# Patient Record
Sex: MALE | Race: WHITE | Employment: FULL TIME | URBAN - METROPOLITAN AREA
[De-identification: names, ages, dates, MRNs, and addresses within clinical notes are randomized per-mention and may not be internally consistent; named-entity substitution may affect disease eponyms.]

---

## 2022-02-18 ENCOUNTER — OFFICE VISIT (OUTPATIENT)
Dept: PRIMARY CARE CLINIC | Age: 27
End: 2022-02-18
Payer: COMMERCIAL

## 2022-02-18 VITALS
DIASTOLIC BLOOD PRESSURE: 72 MMHG | HEIGHT: 71 IN | WEIGHT: 178.6 LBS | SYSTOLIC BLOOD PRESSURE: 122 MMHG | TEMPERATURE: 97.6 F | OXYGEN SATURATION: 98 % | HEART RATE: 85 BPM | BODY MASS INDEX: 25 KG/M2

## 2022-02-18 DIAGNOSIS — R01.1 HEART MURMUR: ICD-10-CM

## 2022-02-18 DIAGNOSIS — J20.8 VIRAL BRONCHITIS: Primary | ICD-10-CM

## 2022-02-18 PROCEDURE — 99203 OFFICE O/P NEW LOW 30 MIN: CPT | Performed by: FAMILY MEDICINE

## 2022-02-18 SDOH — ECONOMIC STABILITY: FOOD INSECURITY: WITHIN THE PAST 12 MONTHS, THE FOOD YOU BOUGHT JUST DIDN'T LAST AND YOU DIDN'T HAVE MONEY TO GET MORE.: NEVER TRUE

## 2022-02-18 SDOH — ECONOMIC STABILITY: FOOD INSECURITY: WITHIN THE PAST 12 MONTHS, YOU WORRIED THAT YOUR FOOD WOULD RUN OUT BEFORE YOU GOT MONEY TO BUY MORE.: NEVER TRUE

## 2022-02-18 ASSESSMENT — ENCOUNTER SYMPTOMS
SINUS PAIN: 0
SINUS PRESSURE: 0
SORE THROAT: 0
CHEST TIGHTNESS: 0
COUGH: 1
CONSTIPATION: 0
WHEEZING: 0
SHORTNESS OF BREATH: 0
DIARRHEA: 0
ABDOMINAL PAIN: 0
RHINORRHEA: 0

## 2022-02-18 ASSESSMENT — SOCIAL DETERMINANTS OF HEALTH (SDOH): HOW HARD IS IT FOR YOU TO PAY FOR THE VERY BASICS LIKE FOOD, HOUSING, MEDICAL CARE, AND HEATING?: NOT HARD AT ALL
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
## 2022-04-01 DIAGNOSIS — R20.0 ARM NUMBNESS: Primary | ICD-10-CM

## 2022-09-30 ENCOUNTER — HOSPITAL ENCOUNTER (OUTPATIENT)
Age: 27
Discharge: HOME | End: 2022-09-30
Payer: COMMERCIAL

## 2022-09-30 ENCOUNTER — HOSPITAL ENCOUNTER (OUTPATIENT)
Dept: GENERAL RADIOLOGY | Age: 27
Discharge: HOME | End: 2022-09-30
Payer: COMMERCIAL

## 2022-09-30 DIAGNOSIS — M25.511 ACUTE PAIN OF RIGHT SHOULDER: ICD-10-CM

## 2022-09-30 DIAGNOSIS — S47.1XXA: ICD-10-CM

## 2022-09-30 PROCEDURE — 73000 X-RAY EXAM OF COLLAR BONE: CPT

## 2022-09-30 PROCEDURE — 73030 X-RAY EXAM OF SHOULDER: CPT

## 2022-09-30 PROCEDURE — 73010 X-RAY EXAM OF SHOULDER BLADE: CPT

## 2022-11-15 ENCOUNTER — HOSPITAL ENCOUNTER (OUTPATIENT)
Dept: CARDIOLOGY | Age: 27
Discharge: HOME | End: 2022-11-15
Payer: COMMERCIAL

## 2022-11-15 DIAGNOSIS — R01.1 HEART MURMUR: ICD-10-CM

## 2022-11-15 LAB
LV EF: 55 %
LVEF MODALITY: NORMAL

## 2022-11-15 PROCEDURE — 93306 TTE W/DOPPLER COMPLETE: CPT

## 2024-01-22 SDOH — HEALTH STABILITY: PHYSICAL HEALTH: ON AVERAGE, HOW MANY MINUTES DO YOU ENGAGE IN EXERCISE AT THIS LEVEL?: 60 MIN

## 2024-01-22 SDOH — HEALTH STABILITY: PHYSICAL HEALTH: ON AVERAGE, HOW MANY DAYS PER WEEK DO YOU ENGAGE IN MODERATE TO STRENUOUS EXERCISE (LIKE A BRISK WALK)?: 4 DAYS

## 2024-01-25 ENCOUNTER — OFFICE VISIT (OUTPATIENT)
Dept: FAMILY MEDICINE CLINIC | Age: 29
End: 2024-01-25
Payer: COMMERCIAL

## 2024-01-25 VITALS
OXYGEN SATURATION: 99 % | DIASTOLIC BLOOD PRESSURE: 68 MMHG | HEIGHT: 71 IN | SYSTOLIC BLOOD PRESSURE: 120 MMHG | BODY MASS INDEX: 25.45 KG/M2 | WEIGHT: 181.8 LBS | HEART RATE: 69 BPM | TEMPERATURE: 98.1 F

## 2024-01-25 DIAGNOSIS — Z76.89 ENCOUNTER TO ESTABLISH CARE: Primary | ICD-10-CM

## 2024-01-25 DIAGNOSIS — R00.2 PALPITATIONS: ICD-10-CM

## 2024-01-25 DIAGNOSIS — Z87.898 HISTORY OF SEIZURES: ICD-10-CM

## 2024-01-25 PROBLEM — G54.0 TOS (THORACIC OUTLET SYNDROME): Status: ACTIVE | Noted: 2022-05-11

## 2024-01-25 LAB
ALBUMIN SERPL-MCNC: 4.7 G/DL (ref 3.4–5)
ALBUMIN/GLOB SERPL: 1.7 {RATIO} (ref 1.1–2.2)
ALP SERPL-CCNC: 103 U/L (ref 40–129)
ALT SERPL-CCNC: 18 U/L (ref 10–40)
ANION GAP SERPL CALCULATED.3IONS-SCNC: 8 MMOL/L (ref 3–16)
AST SERPL-CCNC: 18 U/L (ref 15–37)
BILIRUB SERPL-MCNC: 0.4 MG/DL (ref 0–1)
BUN SERPL-MCNC: 22 MG/DL (ref 7–20)
CALCIUM SERPL-MCNC: 9.2 MG/DL (ref 8.3–10.6)
CHLORIDE SERPL-SCNC: 105 MMOL/L (ref 99–110)
CO2 SERPL-SCNC: 28 MMOL/L (ref 21–32)
CREAT SERPL-MCNC: 0.9 MG/DL (ref 0.9–1.3)
DEPRECATED RDW RBC AUTO: 13.4 % (ref 12.4–15.4)
GFR SERPLBLD CREATININE-BSD FMLA CKD-EPI: >60 ML/MIN/{1.73_M2}
GLUCOSE SERPL-MCNC: 95 MG/DL (ref 70–99)
HCT VFR BLD AUTO: 43.4 % (ref 40.5–52.5)
HGB BLD-MCNC: 14.9 G/DL (ref 13.5–17.5)
MCH RBC QN AUTO: 29.8 PG (ref 26–34)
MCHC RBC AUTO-ENTMCNC: 34.3 G/DL (ref 31–36)
MCV RBC AUTO: 87.1 FL (ref 80–100)
PLATELET # BLD AUTO: 223 K/UL (ref 135–450)
PMV BLD AUTO: 7.5 FL (ref 5–10.5)
POTASSIUM SERPL-SCNC: 4.3 MMOL/L (ref 3.5–5.1)
PROT SERPL-MCNC: 7.4 G/DL (ref 6.4–8.2)
RBC # BLD AUTO: 4.99 M/UL (ref 4.2–5.9)
SODIUM SERPL-SCNC: 141 MMOL/L (ref 136–145)
TSH SERPL DL<=0.005 MIU/L-ACNC: 1.96 UIU/ML (ref 0.27–4.2)
WBC # BLD AUTO: 4.5 K/UL (ref 4–11)

## 2024-01-25 PROCEDURE — 99204 OFFICE O/P NEW MOD 45 MIN: CPT | Performed by: NURSE PRACTITIONER

## 2024-01-25 SDOH — ECONOMIC STABILITY: FOOD INSECURITY: WITHIN THE PAST 12 MONTHS, THE FOOD YOU BOUGHT JUST DIDN'T LAST AND YOU DIDN'T HAVE MONEY TO GET MORE.: NEVER TRUE

## 2024-01-25 SDOH — ECONOMIC STABILITY: FOOD INSECURITY: WITHIN THE PAST 12 MONTHS, YOU WORRIED THAT YOUR FOOD WOULD RUN OUT BEFORE YOU GOT MONEY TO BUY MORE.: NEVER TRUE

## 2024-01-25 SDOH — ECONOMIC STABILITY: HOUSING INSECURITY
IN THE LAST 12 MONTHS, WAS THERE A TIME WHEN YOU DID NOT HAVE A STEADY PLACE TO SLEEP OR SLEPT IN A SHELTER (INCLUDING NOW)?: NO

## 2024-01-25 SDOH — ECONOMIC STABILITY: INCOME INSECURITY: HOW HARD IS IT FOR YOU TO PAY FOR THE VERY BASICS LIKE FOOD, HOUSING, MEDICAL CARE, AND HEATING?: NOT HARD AT ALL

## 2024-01-25 ASSESSMENT — ENCOUNTER SYMPTOMS
ROS SKIN COMMENTS: MOLES
BACK PAIN: 1
GASTROINTESTINAL NEGATIVE: 1
COUGH: 0
SHORTNESS OF BREATH: 0

## 2024-01-25 ASSESSMENT — PATIENT HEALTH QUESTIONNAIRE - PHQ9
SUM OF ALL RESPONSES TO PHQ9 QUESTIONS 1 & 2: 0
SUM OF ALL RESPONSES TO PHQ QUESTIONS 1-9: 0
1. LITTLE INTEREST OR PLEASURE IN DOING THINGS: 0
2. FEELING DOWN, DEPRESSED OR HOPELESS: 0
SUM OF ALL RESPONSES TO PHQ QUESTIONS 1-9: 0

## 2024-01-25 NOTE — PROGRESS NOTES
Maikel Wall (:  1995) is a 28 y.o. male,New patient, here for evaluation of the following chief complaint(s):  New Patient (New patient to get established. Pt has epilepsy as a child and took medicine. Spouse states she sees him flutter his eyes and it was a symptom as a kid. Is fasting. ) and Palpitations (If patient has too much alcohol or high calorie intake then he will get heart palpitations. It is off and on. )         ASSESSMENT/PLAN:  1. Encounter to establish care  Reviewed PMH, medications and need for labs.     2. Palpitations  New. Most likely related to dehydration and alcohol use. Discussed red flag s/sx. Follow pt education included in AVS.   - CBC; Future  - Comprehensive Metabolic Panel; Future  - TSH; Future    3. History of seizures  Stable. Recommend neuro eval and follow up. Encouraged pt to call today.   - External Referral To Neurology  - Dani JOSHI Stephanie A, MD, Neurology, [facility]       Return for yearly physical.         Subjective   SUBJECTIVE/OBJECTIVE:  HPI  Chief Complaint   Patient presents with    New Patient     New patient to get established. Pt has epilepsy as a child and took medicine. Spouse states she sees him flutter his eyes and it was a symptom as a kid. Is fasting.     Palpitations     If patient has too much alcohol or high calorie intake then he will get heart palpitations. It is off and on.    Pt does not notice the blinking episodes, states he only notices bc his wife tells him. Symptoms are more in the afternoon, that is when he is around his wife the most. States he is able stay focused, does not feel like he is not remembering or having loc. States he had absence seizures in the past. Pt was treated in [place] for seizures, has not had medications in 15+yrs.     Palpitations-over the last week, had one episode of dizziness at work, not sure if it was related to the palpitations. Social drinking, 1-3 drinks a week. If drinks one day avoids for the

## 2024-05-13 ENCOUNTER — TELEPHONE (OUTPATIENT)
Age: 29
End: 2024-05-13

## 2024-05-13 ENCOUNTER — OFFICE VISIT (OUTPATIENT)
Dept: NEUROLOGY | Age: 29
End: 2024-05-13
Payer: COMMERCIAL

## 2024-05-13 VITALS
BODY MASS INDEX: 25.9 KG/M2 | HEART RATE: 66 BPM | SYSTOLIC BLOOD PRESSURE: 122 MMHG | WEIGHT: 185 LBS | HEIGHT: 71 IN | OXYGEN SATURATION: 99 % | DIASTOLIC BLOOD PRESSURE: 64 MMHG

## 2024-05-13 DIAGNOSIS — R56.9 SEIZURE (HCC): Primary | ICD-10-CM

## 2024-05-13 PROCEDURE — 99204 OFFICE O/P NEW MOD 45 MIN: CPT | Performed by: PSYCHIATRY & NEUROLOGY

## 2024-05-13 NOTE — PROGRESS NOTES
Neurology outpatient new visit    Patient name: Maikel Wall      Chief Complaint:  Absence seizure.    History of present illness:  This is a 28 years old right-handed male.  The patient is here for evaluation of absence seizure.  The patient was diagnosed with absence seizure since childhood.  But the patient never been on antiseizure medication.  The patient also reports intermittent muscle twitching during the day.  The patient denies history of grand mal seizure.  Per the patient, he is not aware when he has the seizure.  The patient was noticed by his wife multiple times with eyelid twitching.  The patient had EEG done in the past.  But he cannot remember the results.    Past medical history:    Past Medical History:   Diagnosis Date    ADHD (attention deficit hyperactivity disorder) 1996    Seizures (HCC) 1996    Early as i know.       Past surgical history:    Past Surgical History:   Procedure Laterality Date    COSMETIC SURGERY  06/06/2020    Had a mole removed        Medication:    No current outpatient medications on file.     No current facility-administered medications for this visit.       Allergies:    Allergies as of 05/13/2024 - Fully Reviewed 05/13/2024   Allergen Reaction Noted    Augmentin [amoxicillin-pot clavulanate]  02/18/2022        Social history:     reports that he has never smoked. He has never used smokeless tobacco. He reports current alcohol use of about 1.0 - 2.0 standard drink of alcohol per week. He reports that he does not use drugs.     Family history:    Family History   Problem Relation Age of Onset    Diabetes Maternal Grandmother         Review of system:  No chest pain, shortness of breath, palpitation, cough, fever, abdominal pain, vomiting, diarrhea, dysuria, vertigo, joint pain, change in speech/vision or new onset of weakness/numbness. Remaining as per HPI.      /64 (Site: Left Upper Arm)   Pulse 66   Ht 1.803 m (5' 11\")   Wt 83.9 kg (185 lb)

## 2024-05-13 NOTE — TELEPHONE ENCOUNTER
Pt had NP consult with Dr. Armas today & he ordered EEG which is scheduled 5/24/24 @ [facility].  Per Dr. Armas - if results are normal, he does not need f/u with us.

## 2024-05-13 NOTE — PATIENT INSTRUCTIONS
YOU MUST CONFIRM YOUR APPOINTMENT 1 DAY PRIOR OR IT WILL BE CANCELLED!!   Our office will call you 3 times the day prior to your appointment in an attempt to confirm.  Please return our call ASAP or confirm your appt through [facility] no later than 3 pm the day before your appointment.  If we do not hear back from you by 3 pm to confirm, your appointment will be cancelled & someone will be added into that slot from our wait list.       EEG PREP:  1. Patient must NOT have more than 5 hours of sleep prior to the EEG  2. Patient should have CLEAN hair, no hairspray, gel, cream rinse, hair pins, or chemical treatments within 48 hours prior to EEG  3. NO caffeine, pain medications or sedatives on the day of the test   4. Arrive 30 minutes prior to appointment time (check in at Registration Desk on 1st floor of hospital main entrance - by the [employer])  5. Procedure will last 60-90 minutes.

## 2024-05-24 ENCOUNTER — HOSPITAL ENCOUNTER (OUTPATIENT)
Dept: NEUROLOGY | Age: 29
Discharge: HOME | End: 2024-05-24
Payer: COMMERCIAL

## 2024-05-24 DIAGNOSIS — R56.9 SEIZURE (HCC): ICD-10-CM

## 2024-05-24 PROCEDURE — 95813 EEG EXTND MNTR 61-119 MIN: CPT

## 2024-05-28 NOTE — TELEPHONE ENCOUNTER
Per Dr. Armas - pt needs f/u appt to discuss results of EEG which were abnormal.  Office to watch for cancellations.

## 2024-05-29 NOTE — TELEPHONE ENCOUNTER
Spoke with pt - he prefers [facility] but there's no appointments open at this time in [facility] for the month of June so I scheduled him for [facility] on 6/6 @ 1:50 to discuss results.

## 2024-06-06 ENCOUNTER — TELEMEDICINE (OUTPATIENT)
Age: 29
End: 2024-06-06
Payer: COMMERCIAL

## 2024-06-06 DIAGNOSIS — R56.9 SEIZURE (HCC): Primary | ICD-10-CM

## 2024-06-06 PROCEDURE — 99214 OFFICE O/P EST MOD 30 MIN: CPT | Performed by: PSYCHIATRY & NEUROLOGY

## 2024-06-06 RX ORDER — LEVETIRACETAM 500 MG/1
500 TABLET ORAL 2 TIMES DAILY
Qty: 60 TABLET | Refills: 3 | Status: SHIPPED | OUTPATIENT
Start: 2024-06-06

## 2024-06-06 NOTE — PROGRESS NOTES
Neurology outpatient F/U visit    Patient name: Maikel Wall      Chief Complaint:  Absence seizure.    History of present illness:  This is a 28 years old right-handed male.  The patient is here for evaluation of absence seizure.  The patient was diagnosed with absence seizure since childhood.  But the patient never been on antiseizure medication.  The patient also reports intermittent muscle twitching during the day.  The patient denies history of grand mal seizure.  Per the patient, he is not aware when he has the seizure.  The patient was noticed by his wife multiple times with eyelid twitching.  The patient had EEG done in the past.  But he cannot remember the results.    Interval History:  06/06/24: The patient is here for follow-up after the EEG.  Unfortunately, the EEG showed generalized atypical spike and wave.  The patient has not had any further seizures so far.    Past medical history:    Past Medical History:   Diagnosis Date    ADHD (attention deficit hyperactivity disorder) 1996    Seizures (HCC) 1996    Early as i know.       Past surgical history:    Past Surgical History:   Procedure Laterality Date    COSMETIC SURGERY  06/06/2020    Had a mole removed        Medication:    Current Outpatient Medications   Medication Sig Dispense Refill    levETIRAcetam (KEPPRA) 500 MG tablet Take 1 tablet by mouth 2 times daily 60 tablet 3     No current facility-administered medications for this visit.       Allergies:    Allergies as of 06/06/2024 - Fully Reviewed 06/06/2024   Allergen Reaction Noted    Augmentin [amoxicillin-pot clavulanate]  02/18/2022        Social history:     reports that he has never smoked. He has never used smokeless tobacco. He reports current alcohol use of about 1.0 - 2.0 standard drink of alcohol per week. He reports that he does not use drugs.     Family history:    Family History   Problem Relation Age of Onset    Diabetes Maternal Grandmother         Review of

## 2024-08-12 RX ORDER — LEVETIRACETAM 500 MG/1
500 TABLET ORAL 2 TIMES DAILY
Qty: 180 TABLET | Refills: 0 | Status: SHIPPED | OUTPATIENT
Start: 2024-08-12

## 2024-09-10 ENCOUNTER — TELEPHONE (OUTPATIENT)
Age: 29
End: 2024-09-10

## 2024-09-25 ENCOUNTER — HOSPITAL ENCOUNTER (OUTPATIENT)
Dept: NEUROLOGY | Age: 29
Discharge: HOME | End: 2024-09-25
Payer: COMMERCIAL

## 2024-09-25 PROCEDURE — 95813 EEG EXTND MNTR 61-119 MIN: CPT | Performed by: PSYCHIATRY & NEUROLOGY

## 2024-09-25 PROCEDURE — 95813 EEG EXTND MNTR 61-119 MIN: CPT

## 2024-09-30 ENCOUNTER — OFFICE VISIT (OUTPATIENT)
Dept: NEUROLOGY | Age: 29
End: 2024-09-30
Payer: COMMERCIAL

## 2024-09-30 VITALS
WEIGHT: 194 LBS | HEART RATE: 74 BPM | DIASTOLIC BLOOD PRESSURE: 74 MMHG | OXYGEN SATURATION: 98 % | SYSTOLIC BLOOD PRESSURE: 122 MMHG | BODY MASS INDEX: 27.16 KG/M2 | HEIGHT: 71 IN

## 2024-09-30 DIAGNOSIS — G40.309 NONINTRACTABLE GENERALIZED IDIOPATHIC EPILEPSY WITHOUT STATUS EPILEPTICUS (HCC): Primary | ICD-10-CM

## 2024-09-30 DIAGNOSIS — T88.7XXA SIDE EFFECT OF MEDICATION: ICD-10-CM

## 2024-09-30 PROCEDURE — 99214 OFFICE O/P EST MOD 30 MIN: CPT | Performed by: PSYCHIATRY & NEUROLOGY

## 2024-09-30 RX ORDER — LEVETIRACETAM 750 MG/1
750 TABLET ORAL 2 TIMES DAILY
Qty: 180 TABLET | Refills: 1 | Status: SHIPPED | OUTPATIENT
Start: 2024-09-30

## 2024-09-30 NOTE — PATIENT INSTRUCTIONS
YOU MUST CONFIRM YOUR APPOINTMENT 1 DAY PRIOR OR IT WILL BE CANCELLED!!   Our office will call you 3 times the day prior to your appointment in an attempt to confirm.  Please return our call ASAP or confirm your appt through [employer] no later than 3 pm the day before your appointment.  If we do not hear back from you by 3 pm to confirm, your appointment will be cancelled & someone will be added into that slot from our wait list.

## 2025-04-15 ENCOUNTER — HOSPITAL ENCOUNTER (OUTPATIENT)
Dept: NEUROLOGY | Age: 30
Discharge: HOME | End: 2025-04-15
Payer: COMMERCIAL

## 2025-04-15 DIAGNOSIS — T88.7XXA SIDE EFFECT OF MEDICATION: ICD-10-CM

## 2025-04-15 DIAGNOSIS — G40.309 NONINTRACTABLE GENERALIZED IDIOPATHIC EPILEPSY WITHOUT STATUS EPILEPTICUS (HCC): ICD-10-CM

## 2025-04-15 PROCEDURE — 95812 EEG 41-60 MINUTES: CPT | Performed by: PSYCHIATRY & NEUROLOGY

## 2025-04-15 PROCEDURE — 95813 EEG EXTND MNTR 61-119 MIN: CPT

## 2025-04-15 NOTE — PROCEDURES
Electroencephalogram report          Patient: Maikel Wall    MR Number: [id number]  YOB: 1995  Date of Visit: 4/15/2025    Clinical History:  The patient is a 29 y.o. years old male with history of generalized epilepsy, medications reviewed.      Method:  This is one hour digitalized EEG recording. The EEG was performed using the international 10/20 of electrode placements using both referential and bipolar montages. The patient was awake, and drowsy during recording.  Photic stimulation and hyperventilation were performed. Medications reviewed.     Findings:  The background of the EEG showed normal alpha posterior background of --- HZ and amplitude of 20-40 UV. This background was symmetric, waxing and waning, and reactive with eye opening and closure. As the patient became drowsy generalized diffuse slowing was seen through recording at 6-7 HZ.  This generalized slowing was symmetric, non rhythmical, and continuous.  Infrequent 2-3 HZ generalized spike and wave discharges were seen few times throughout the recording, occurring in isolation lasting from 1 to 2 seconds..   Photic stimulation produced normal posterior driving at higher frequency and hyperventilation did not activate the EEG.    Impression:  This one hour  EEG is abnormal.  There is generalized epileptiform discharges is consistent with idiopathic generalized epilepsy.  No evidence of focal and lateralized abnormalities.     Hemanth Mejia MD      Board certified in clinical neurophysiology  [facility]  [address] [address]  Phone: [phone]

## 2025-05-06 RX ORDER — LEVETIRACETAM 750 MG/1
750 TABLET ORAL 2 TIMES DAILY
Qty: 180 TABLET | Refills: 0 | Status: SHIPPED | OUTPATIENT
Start: 2025-05-06

## 2025-05-15 ENCOUNTER — OFFICE VISIT (OUTPATIENT)
Dept: NEUROLOGY | Age: 30
End: 2025-05-15
Payer: COMMERCIAL

## 2025-05-15 VITALS
DIASTOLIC BLOOD PRESSURE: 62 MMHG | SYSTOLIC BLOOD PRESSURE: 120 MMHG | BODY MASS INDEX: 26.9 KG/M2 | WEIGHT: 192.1 LBS | OXYGEN SATURATION: 98 % | HEART RATE: 70 BPM | HEIGHT: 71 IN

## 2025-05-15 DIAGNOSIS — G40.A09 CHILDHOOD ABSENCE EPILEPSY (HCC): Primary | ICD-10-CM

## 2025-05-15 PROBLEM — G40.309 NONINTRACTABLE GENERALIZED IDIOPATHIC EPILEPSY WITHOUT STATUS EPILEPTICUS (HCC): Status: ACTIVE | Noted: 2025-05-15

## 2025-05-15 PROCEDURE — 99213 OFFICE O/P EST LOW 20 MIN: CPT | Performed by: STUDENT IN AN ORGANIZED HEALTH CARE EDUCATION/TRAINING PROGRAM

## 2025-05-15 PROCEDURE — G8427 DOCREV CUR MEDS BY ELIG CLIN: HCPCS | Performed by: STUDENT IN AN ORGANIZED HEALTH CARE EDUCATION/TRAINING PROGRAM

## 2025-05-15 PROCEDURE — 1036F TOBACCO NON-USER: CPT | Performed by: STUDENT IN AN ORGANIZED HEALTH CARE EDUCATION/TRAINING PROGRAM

## 2025-05-15 PROCEDURE — G8419 CALC BMI OUT NRM PARAM NOF/U: HCPCS | Performed by: STUDENT IN AN ORGANIZED HEALTH CARE EDUCATION/TRAINING PROGRAM

## 2025-05-15 RX ORDER — LEVETIRACETAM 1000 MG/1
1000 TABLET ORAL 2 TIMES DAILY
Qty: 60 TABLET | Refills: 11 | Status: SHIPPED | OUTPATIENT
Start: 2025-05-15 | End: 2026-05-10

## 2025-05-15 NOTE — PROGRESS NOTES
History of Present Illness  Follow-up visit.    He has a history of absence seizures, which began in middle school. These seizures are characterized by brief episodes of eye fluttering and behavioral arrest during activities such as eating or talking, often unnoticed by him but observed by others. His wife reports that these episodes occur regularly during meals, with a frequency of 3 to 4 times daily post-medication, a significant improvement from the pre-medication frequency of 18 to 20 times daily. He reports no other types of seizures and is not aware of any family history of seizures. He also notes a decrease in ADHD symptoms since starting Keppra. He continues to drive and reports a generalized sense of warning before or after a seizure, but no seizures have occurred while driving since starting Keppra. He is currently on Keppra 750 mg twice daily, which has significantly reduced the frequency of his seizures. Initially, he experienced increased irritability with Keppra, but this side effect has since resolved. He also reports improved focus and a decreased libido since starting the medication. He has previously tried Depakote, which was discontinued due to potential side effects, and has not tried Zarontin (ethosuximide). He discontinued antiseizure medications during his high school and college years due to symptom control but resumed Keppra when symptoms recurred.    PAST MEDICAL HISTORY: He has scoliosis with less than 5 degrees curvature, bad eyesight, and a lot of skin moles. He was diagnosed with ADHD and got off the medicine in high school because it made him lose appetite.    PAST SURGICAL HISTORY: He had his wisdom teeth removed.    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    FAMILY HISTORY  - Father: ADHD during childhood  - Negative for seizures in the family    /62 (BP Site: Left Upper Arm)   Pulse 70   Ht 1.803 m (5' 11\")   Wt 87.1 kg

## 2025-05-15 NOTE — PATIENT INSTRUCTIONS
SEIZURE FIRST AID  Here are some basic instructions for what to do if your loved one has a seizure:    Always stay with the person until the seizure is over.    Pay attention to how long the seizure lasts.    Stay calm. Most seizures only last a few minutes.    Prevent injury. By moving nearby objects out of the way.    Make the person as comfortable as possible.    Keep onlookers away.    Don't hold the person down.    Don't put anything in the person's mouth.    Don't give water, pills, or food by mouth unless the person is fully alert.    Make sure their breathing is okay.    Know when to call for emergency medical help. Call 911 in the following circumstances:    o A seizure lasts 5 minutes or longer   o One seizure happens right after another without the person regaining consciousness (“coming to”) between seizures   o Seizures happen closer together than usual for that person   o The person has trouble breathing   o The person appears to be choking   o The seizure happens in water, like a swimming pool or bathtub   o The person is injured during the seizure   o You believe this is the first seizure the person has had   o The person asks for medical help   Be sensitive and supportive and ask others to do the same    Learn more about seizure first aid at this website: <https://www.epilepsy.com/recognition/seizure-first-aid>.    SEIZURE PRECAUTIONS  You need to avoid or modify certain activities because you have epilepsy. If a seizure were to occur during specific activities listed below, then the consequences could be death or serious injury.     Activity restrictions and modifications:    Driving:   o You have no driving restrictions related to your seizure disorder because your seizures are well controlled with your current treatment plan.     o You can learn more about your state laws regarding driving restrictions and your duty to report your condition to the DMV at this website:

## 2025-08-28 ENCOUNTER — OFFICE VISIT (OUTPATIENT)
Dept: NEUROLOGY | Age: 30
End: 2025-08-28
Payer: COMMERCIAL

## 2025-08-28 VITALS
BODY MASS INDEX: 27.72 KG/M2 | WEIGHT: 198 LBS | HEIGHT: 71 IN | HEART RATE: 74 BPM | SYSTOLIC BLOOD PRESSURE: 143 MMHG | DIASTOLIC BLOOD PRESSURE: 79 MMHG | OXYGEN SATURATION: 99 %

## 2025-08-28 DIAGNOSIS — G40.A09 CHILDHOOD ABSENCE EPILEPSY (HCC): Primary | ICD-10-CM

## 2025-08-28 PROCEDURE — G8427 DOCREV CUR MEDS BY ELIG CLIN: HCPCS | Performed by: STUDENT IN AN ORGANIZED HEALTH CARE EDUCATION/TRAINING PROGRAM

## 2025-08-28 PROCEDURE — G8419 CALC BMI OUT NRM PARAM NOF/U: HCPCS | Performed by: STUDENT IN AN ORGANIZED HEALTH CARE EDUCATION/TRAINING PROGRAM

## 2025-08-28 PROCEDURE — 99213 OFFICE O/P EST LOW 20 MIN: CPT | Performed by: STUDENT IN AN ORGANIZED HEALTH CARE EDUCATION/TRAINING PROGRAM

## 2025-08-28 PROCEDURE — 1036F TOBACCO NON-USER: CPT | Performed by: STUDENT IN AN ORGANIZED HEALTH CARE EDUCATION/TRAINING PROGRAM

## 2025-08-28 RX ORDER — LEVETIRACETAM 1000 MG/1
1000 TABLET ORAL 2 TIMES DAILY
Qty: 180 TABLET | Refills: 3 | Status: SHIPPED | OUTPATIENT
Start: 2025-08-28 | End: 2026-08-23